# Patient Record
Sex: FEMALE | Race: WHITE | NOT HISPANIC OR LATINO | Employment: OTHER | URBAN - METROPOLITAN AREA
[De-identification: names, ages, dates, MRNs, and addresses within clinical notes are randomized per-mention and may not be internally consistent; named-entity substitution may affect disease eponyms.]

---

## 2017-11-02 ENCOUNTER — AMBULATORY - HEALTHEAST (OUTPATIENT)
Dept: NURSING | Facility: CLINIC | Age: 37
End: 2017-11-02

## 2017-11-02 DIAGNOSIS — Z00.00 HEALTHCARE MAINTENANCE: ICD-10-CM

## 2018-10-11 ENCOUNTER — AMBULATORY - HEALTHEAST (OUTPATIENT)
Dept: NURSING | Facility: CLINIC | Age: 38
End: 2018-10-11

## 2019-01-10 ENCOUNTER — AMBULATORY - HEALTHEAST (OUTPATIENT)
Dept: LAB | Facility: CLINIC | Age: 39
End: 2019-01-10

## 2019-01-10 DIAGNOSIS — R51.9 HEADACHE IN PREGNANCY, THIRD TRIMESTER: ICD-10-CM

## 2019-01-10 DIAGNOSIS — O26.892 HEADACHE IN PREGNANCY, ANTEPARTUM, SECOND TRIMESTER: ICD-10-CM

## 2019-01-10 DIAGNOSIS — O26.893 HEADACHE IN PREGNANCY, THIRD TRIMESTER: ICD-10-CM

## 2019-01-10 DIAGNOSIS — N89.8 VAGINAL DISCHARGE IN PREGNANCY, FIRST TRIMESTER: ICD-10-CM

## 2019-01-10 DIAGNOSIS — O26.891 VAGINAL DISCHARGE IN PREGNANCY, FIRST TRIMESTER: ICD-10-CM

## 2019-01-10 DIAGNOSIS — R51.9 HEADACHE IN PREGNANCY, ANTEPARTUM, SECOND TRIMESTER: ICD-10-CM

## 2019-01-10 LAB — PROGEST SERPL-MCNC: 21.7 NG/ML

## 2019-01-24 ENCOUNTER — AMBULATORY - HEALTHEAST (OUTPATIENT)
Dept: LAB | Facility: CLINIC | Age: 39
End: 2019-01-24

## 2019-01-24 DIAGNOSIS — O26.893 HEADACHE IN PREGNANCY, THIRD TRIMESTER: ICD-10-CM

## 2019-01-24 DIAGNOSIS — R51.9 HEADACHE IN PREGNANCY, THIRD TRIMESTER: ICD-10-CM

## 2019-01-24 DIAGNOSIS — O26.891 VAGINAL DISCHARGE IN PREGNANCY, FIRST TRIMESTER: ICD-10-CM

## 2019-01-24 DIAGNOSIS — N89.8 VAGINAL DISCHARGE IN PREGNANCY, FIRST TRIMESTER: ICD-10-CM

## 2019-01-24 DIAGNOSIS — R51.9 HEADACHE IN PREGNANCY, ANTEPARTUM, SECOND TRIMESTER: ICD-10-CM

## 2019-01-24 DIAGNOSIS — O26.892 HEADACHE IN PREGNANCY, ANTEPARTUM, SECOND TRIMESTER: ICD-10-CM

## 2019-01-24 LAB — PROGEST SERPL-MCNC: 14.2 NG/ML

## 2019-02-07 ENCOUNTER — AMBULATORY - HEALTHEAST (OUTPATIENT)
Dept: LAB | Facility: CLINIC | Age: 39
End: 2019-02-07

## 2019-02-07 DIAGNOSIS — O26.892 HEADACHE IN PREGNANCY, ANTEPARTUM, SECOND TRIMESTER: ICD-10-CM

## 2019-02-07 DIAGNOSIS — O26.893 HEADACHE IN PREGNANCY, THIRD TRIMESTER: ICD-10-CM

## 2019-02-07 DIAGNOSIS — N89.8 VAGINAL DISCHARGE IN PREGNANCY, FIRST TRIMESTER: ICD-10-CM

## 2019-02-07 DIAGNOSIS — R51.9 HEADACHE IN PREGNANCY, THIRD TRIMESTER: ICD-10-CM

## 2019-02-07 DIAGNOSIS — O26.891 VAGINAL DISCHARGE IN PREGNANCY, FIRST TRIMESTER: ICD-10-CM

## 2019-02-07 DIAGNOSIS — R51.9 HEADACHE IN PREGNANCY, ANTEPARTUM, SECOND TRIMESTER: ICD-10-CM

## 2019-02-07 LAB — PROGEST SERPL-MCNC: 21 NG/ML

## 2019-02-26 ENCOUNTER — AMBULATORY - HEALTHEAST (OUTPATIENT)
Dept: LAB | Facility: CLINIC | Age: 39
End: 2019-02-26

## 2019-02-26 DIAGNOSIS — R51.9 HEADACHE IN PREGNANCY, THIRD TRIMESTER: ICD-10-CM

## 2019-02-26 DIAGNOSIS — O26.892 HEADACHE IN PREGNANCY, ANTEPARTUM, SECOND TRIMESTER: ICD-10-CM

## 2019-02-26 DIAGNOSIS — O26.893 HEADACHE IN PREGNANCY, THIRD TRIMESTER: ICD-10-CM

## 2019-02-26 DIAGNOSIS — R51.9 HEADACHE IN PREGNANCY, ANTEPARTUM, SECOND TRIMESTER: ICD-10-CM

## 2019-02-26 DIAGNOSIS — O26.891 VAGINAL DISCHARGE IN PREGNANCY, FIRST TRIMESTER: ICD-10-CM

## 2019-02-26 DIAGNOSIS — N89.8 VAGINAL DISCHARGE IN PREGNANCY, FIRST TRIMESTER: ICD-10-CM

## 2019-02-26 LAB — PROGEST SERPL-MCNC: 19.6 NG/ML

## 2019-08-09 ENCOUNTER — RECORDS - HEALTHEAST (OUTPATIENT)
Dept: ADMINISTRATIVE | Facility: OTHER | Age: 39
End: 2019-08-09

## 2019-08-15 ENCOUNTER — RECORDS - HEALTHEAST (OUTPATIENT)
Dept: ADMINISTRATIVE | Facility: OTHER | Age: 39
End: 2019-08-15

## 2019-08-17 ENCOUNTER — ANESTHESIA - HEALTHEAST (OUTPATIENT)
Dept: OBGYN | Facility: HOSPITAL | Age: 39
End: 2019-08-17

## 2019-08-19 ENCOUNTER — HOME CARE/HOSPICE - HEALTHEAST (OUTPATIENT)
Dept: HOME HEALTH SERVICES | Facility: HOME HEALTH | Age: 39
End: 2019-08-19

## 2019-08-31 ENCOUNTER — AMBULATORY - HEALTHEAST (OUTPATIENT)
Dept: PEDIATRICS | Facility: CLINIC | Age: 39
End: 2019-08-31

## 2019-10-16 ENCOUNTER — AMBULATORY - HEALTHEAST (OUTPATIENT)
Dept: NURSING | Facility: CLINIC | Age: 39
End: 2019-10-16

## 2020-07-01 ENCOUNTER — AMBULATORY - HEALTHEAST (OUTPATIENT)
Dept: ADMINISTRATIVE | Facility: CLINIC | Age: 40
End: 2020-07-01

## 2020-07-01 DIAGNOSIS — L98.8 LESION OF SKIN OF BREAST: ICD-10-CM

## 2020-07-20 ENCOUNTER — HOSPITAL ENCOUNTER (OUTPATIENT)
Dept: SURGERY | Facility: CLINIC | Age: 40
Discharge: HOME | End: 2020-07-20
Attending: OBSTETRICS & GYNECOLOGY

## 2020-07-20 DIAGNOSIS — L72.3 SEBACEOUS CYST: ICD-10-CM

## 2020-07-20 ASSESSMENT — MIFFLIN-ST. JEOR: SCORE: 1849.09

## 2020-09-22 ENCOUNTER — AMBULATORY - HEALTHEAST (OUTPATIENT)
Dept: NURSING | Facility: CLINIC | Age: 40
End: 2020-09-22

## 2021-01-15 ENCOUNTER — COMMUNICATION - HEALTHEAST (OUTPATIENT)
Dept: SCHEDULING | Facility: CLINIC | Age: 41
End: 2021-01-15

## 2021-01-15 ENCOUNTER — OFFICE VISIT - HEALTHEAST (OUTPATIENT)
Dept: FAMILY MEDICINE | Facility: CLINIC | Age: 41
End: 2021-01-15

## 2021-01-15 DIAGNOSIS — J02.9 SORE THROAT: ICD-10-CM

## 2021-01-15 RX ORDER — PENICILLIN V POTASSIUM 500 MG/1
TABLET, FILM COATED ORAL
Status: SHIPPED | COMMUNITY
Start: 2021-01-15 | End: 2023-06-16

## 2021-01-16 ENCOUNTER — AMBULATORY - HEALTHEAST (OUTPATIENT)
Dept: FAMILY MEDICINE | Facility: CLINIC | Age: 41
End: 2021-01-16

## 2021-01-16 DIAGNOSIS — J02.9 SORE THROAT: ICD-10-CM

## 2021-01-17 LAB
SARS-COV-2 PCR COMMENT: NORMAL
SARS-COV-2 RNA SPEC QL NAA+PROBE: NEGATIVE
SARS-COV-2 VIRUS SPECIMEN SOURCE: NORMAL

## 2021-01-18 ENCOUNTER — COMMUNICATION - HEALTHEAST (OUTPATIENT)
Dept: SCHEDULING | Facility: CLINIC | Age: 41
End: 2021-01-18

## 2021-05-31 NOTE — PROGRESS NOTES
[facility] Lactation Visit    Assessment:  Katerina Kapoor is a female, Gestational Age: 38w3d infant, seen in clinic today for a lactation visit.     Katerina Kapoor is having difficulties with feeding. Katerina Kapoor has lost 0.9 oz since their last visit 4 days ago. She is down -7% from birthweight. She is very sleepy at the breast and requires frequent stimulation to initiate sucking. Mother reports infant is able to take 2 oz of pumped milk or formula without any difficulties. Infant was able to transfer 1.4 oz from the breast today. This has improved from last week's lactation visit. She is voiding and stooling adequately. Discussed more frequent feedings from the breast and supplementation after nursing.     Infant also with jaundice appearance today. Given poor weight gain and sleepiness, elected to recheck bilirubin again today. Gave report to Dr. Stephen, on-call physician, who will follow up with the results and call family.     1.  difficulty in feeding at breast     2. Fetal and  jaundice  Bilirubin,  Panel       Plan:    Feeding plan:  It is important for you to breast-feed Katerina Kapoor every 2-3 hours or more frequently based on Katerina Kapoor's cues. This should be about 8-12 times a day. Nurse every 2 hours during the day and every 3 hours during the night. It is important that you don't wait longer than 3 hours for the next feeding. Offer both breasts for each feeding. Use nipple shield with nursing. Make sure lips are flanged out. Offer both breasts for each feeding. Stimulate Katerina frequently while nursing.     Supplementation plan:  Katerina Kapoor should be supplemented with expressed breast-milk or formula after breast-feeding. May offer 1.5-2 oz after each nursing. May increase base on her cues. Typically at this age, she should get about 2-3 oz per feeding on average.     Pumping plan:  To ensure you have adequate milk supply, you should continue to pump  after feeds. The more your breast-feed and pump, the more you make more milk. Empty breasts gives you more milk for the next feeding.     Start Vitamin D supplementation 400 international units, once a day, when Katerina Kapoor is back to birthweight.    Katerina Kapoor should have about 6-8 wet diapers and about 2-4 stools per day.     Follow up in clinic in 5 days for a follow up and weight check.  Please return to clinic sooner, if Katerina Kapoor is not wanting to feed, becomes more sleepy, has less than 4-6 wet diapers in a day, develops a fever greater than 100.4 F, increasing signs of jaundice, or is inconsolable. ____________________________________________________________________  SUBJECTIVE:     Katerina Kapoor is a 2 wk.o., female, for lactation support. This patient is accompanied in the office by her mother.    Concerns: gave 36 cc in the last hour prior to clinic appt today. Following up today from last week. Infant needs frequent stimulation at the breast.    Baby is nursing every 3 hours for about 30-60 minutes per session.   Mother reports hearing audible swallows.   Baby feeds about 8 times in 24 hours and wakes up on own for feeds. Mom is using a nipple shield but mom sometimes infant cant nurse without a nipple shield. Baby stimulation at the breast.     Baby is supplemented with pumped milk and formula, about 2 oz after feeds about 2-3 times a day.    Baby has about 8-9 wet diapers and 7 stools per day. Stools are yellow in color.    Mom is also pumping about 2 per day and gets about 2 oz per pumping session.    Mother reports history of poor milk supply with first child.     Patient Active Problem List    Diagnosis Date Noted     Group B Streptococcus exposure with inadequate intrapartum antibiotic prophylaxis 2019     Term , current hospitalization 2019     Results for orders placed or performed in visit on 19   Bilirubin,  Panel   Result Value Ref Range     Bilirubin, Total 14.3 (H) 0.0 - 6.0 mg/dL    Bilirubin, Direct 0.5 <=0.5 mg/dL    Bilirubin, Indirect 13.8 (H) 0.0 - 6.0 mg/dL    Age in Hours 312 hours       Current Outpatient Medications:      nystatin (MYCOSTATIN) ointment, Apply topically 3 (three) times a day., Disp: 30 g, Rfl: 1  No past medical history on file.  Past Surgical History:   Procedure Laterality Date     NO PAST SURGERIES       Family History   Problem Relation Age of Onset     Mental illness Mother         Copied from mother's history at birth     Allergy (severe) Mother      Allergy (severe) Maternal Uncle      Asthma Maternal Uncle      Hypertension Maternal Grandmother      Diabetes Maternal Grandmother      Cancer Maternal Grandmother      Heart disease Maternal Grandfather      Eczema Paternal Grandmother      Hyperlipidemia Paternal Grandfather      Cancer Paternal Grandfather      Mental illness Paternal Grandfather        Primary care provider: Ritika Alexander MD    OBJECTIVE:    Mother:   Nipples are everted, the areola is compressible, the breast is soft and full.     Sore nipples: none.   Maternal pain: none.    Maternal depression screening: Doing well    Infant:   Vitals:    19 1515   Pulse: 140   Temp: 98.4  F (36.9  C)   TempSrc: Rectal   Weight: 6 lb 15.6 oz (3.164 kg)       Average weight gain over last 4 days: -0.9 oz     Weight:   Birthweight: 7 lb 8 oz (3.402 kg)  Clinic weight on 19: 7 lbs 0.5 oz  Today's weight:   Vitals:    19 1515   Weight: 6 lb 15.6 oz (3.164 kg)       -7% from birth weight.    Lactation scale pre-feeding weight: 6 lbs 15.6 oz    Weight after left side feedin lbs 15.6 oz  Weight after right side feedin lbs 1.2 oz  Amount of milk transferred from LEFT side: 0 oz  Amount of milk transferred from RIGHT side: 1.4 oz    Total transfer: 1.4 oz    Feeding assessment:     Infant can draw gloved finger into mouth. Infant demonstrated a coordinated suck. Infant palate is intact,  "tongue over gums, normal frenulum.   Infant can hold suction with tongue while at the breast. Infant needed frequent stimulation at the breast.     Alignment: Infant's head was aligned with its trunk. Infant did face mother. Baby was in cross-cradle position today. Discussed importance of infant ventral positioning to obtain a deeper latch.     Areolar Grasp:  Infant was assisted by gently applying downward pressure to the chin to open mouth wide.. Infant's lips were not pursed. Infant's lips did flange outward. Tongue was visible just barely over bottom lip. Infant had complete seal.     Areolar Compression: Infant made rhythmic motion. There were no clicking or smacking sounds. There was no severe nipple discomfort.  Nipples appeared slightly wedged (minimal creasing) after feeding.    Audible swallowing: Infant made quiet sounds of swallowing. There was an increase in frequency after milk ejection reflex    PHYSICAL EXAM    Gen: Alert, no acute distress.   Head: Anterior and posterior fontanelles are flat and soft.   Eyes: No eye drainage. Scleral icterus. Bilateral red reflexes present.   Ears: Pinna appear well-formed. No pits.   Nose: nares patent. No nasal congestion. No nasal flaring.  Mouth: Oral mucosa moist. Tongue midline (tongue elevation adequate when crying, good lateralization). Frenulum palpable. No \"heart-shaped\" tongue. Tongue able to extend pass lower gumline. Lips closed at rest.   Neck: Clavicles intact bilaterally.  Lungs: Clear to auscultation bilaterally.   Cardiac: Regular regular rate and rhythm, S1S2, no murmurs. Brachial and femoral pulses +2 and equal.  Abdomen: Soft, nontender, bowel sounds present, no hepatosplenomegaly or mass palpable. Umbilicus dry with no erythema or drainage.   : Rene stage 1 female genitalia  Skin: Intact. Dry. No rash. Mild jaundice down to chest.   Musculoskeletal: equal movements of upper and lower extremities. Negative Ortolani and Aguilar " maneuver.  Neuro: Appropriate muscle tone.    The visit lasted a total of 40 minutes that I spent face to face with the patient. Of that time, 35 minutes were spent on lactation. Over 50% of the time was spent counseling and educating the patient about normal  care and growth.    Completed by:   DMITRI Canchola, CPNP, IBCLC  [facility]  [facility]  2019, 1:23 PM

## 2021-05-31 NOTE — ANESTHESIA POSTPROCEDURE EVALUATION
Patient: Darlyn Kapoor  * No procedures listed *  Anesthesia type: epidural    Patient location: Labor and Delivery  Last vitals: No vitals data found for the desired time range.    Post vital signs: stable  Level of consciousness: awake and responds to simple questions  Post-anesthesia pain: pain controlled  Post-anesthesia nausea and vomiting: no  Pulmonary: unassisted, return to baseline  Cardiovascular: stable and blood pressure at baseline  Hydration: adequate  Anesthetic events: no    QCDR Measures:  ASA# 11 - Penny-op Cardiac Arrest: ASA11B - Patient did NOT experience unanticipated cardiac arrest  ASA# 12 - Penny-op Mortality Rate: ASA12B - Patient did NOT die  ASA# 13 - PACU Re-Intubation Rate: NA - No ETT / LMA used for case  ASA# 10 - Composite Anes Safety: ASA10A - No serious adverse event    Additional Notes:    Patient is comfortable s/p labor epidural. Patient is satisfied with her anesthetic and recovering well. Patient's sensory and motor function in LE are returning to baseline, emptying bladder as expected, minimal to no tenderness at neuraxial insertion site. Advised patient to alert her nurses, physicians, department of anesthesia if anything changes.

## 2021-05-31 NOTE — ANESTHESIA PREPROCEDURE EVALUATION
Anesthesia Evaluation      Patient summary reviewed     Airway   Mallampati: II   Pulmonary - negative ROS                          Cardiovascular - negative ROS   Neuro/Psych - negative ROS     Endo/Other    (+) diabetes mellitus, obesity, pregnant     Comments: Gestational DM    GI/Hepatic/Renal - negative ROS      Other findings: TOLAC      Dental - normal exam                        Anesthesia Plan  Planned anesthetic: epidural    ASA 2     Anesthetic plan and risks discussed with: patient and spouse    Post-op plan: routine recovery

## 2021-05-31 NOTE — ANESTHESIA PROCEDURE NOTES
Epidural Block    Patient location during procedure: OB  Time Called: 8/17/2019 12:31 PM  Reason for Block:labor epidural  Staffing:  Performing  Anesthesiologist: Tito Weller MD  Preanesthetic Checklist  Completed: patient identified, risks, benefits, and alternatives discussed, timeout performed, consent obtained, airway assessed, oxygen available, suction available, emergency drugs available and hand hygiene performed  Procedure  Patient position: sitting  Prep: ChloraPrep and site prepped and draped  Patient monitoring: continuous pulse oximetry, heart rate and blood pressure  Approach: midline  Location: L3-L4  Injection technique: [place] saline  Number of Attempts:1  Needle  Needle type: [facility] EHRchai   Needle gauge: 18 G     Catheter in Space: 3  Assessment  Sensory level:  No complications

## 2021-06-03 ENCOUNTER — RECORDS - HEALTHEAST (OUTPATIENT)
Dept: ADMINISTRATIVE | Facility: CLINIC | Age: 41
End: 2021-06-03

## 2021-06-04 VITALS — HEIGHT: 68 IN | WEIGHT: 251 LBS | BODY MASS INDEX: 38.04 KG/M2

## 2021-06-09 NOTE — PROGRESS NOTES
"History:  This is a 40 y.o. female who I'm asked to see for evaluation of a skin lesion involving her left breast.  She states that she had a \"bubble\" directly underneath her skin within the left breast lower outer quadrant from February through .  At one point in time it had become infected.  She describes it becoming enlarged, painful, and red.  She used rubbing alcohol and over the course of 5 days it decreased in size and symptoms.  Now over the last month, she feels like this area has disappeared.  There is just a small dark spot that remains where she used to be able to feel the lump.    Allergies:  No Known Allergies    Past medical history:  Depression/anxiety  PCOS    Is currently nursing her 11-month-old    Past surgical history:    [place] tooth extraction    Current Outpatient Medications:      cholecalciferol, vitamin D3, (VITAMIN D3 ORAL), Take by mouth., Disp: , Rfl:      UNABLE TO FIND, Med Name: [place] by mouth, supplement for breast milk supply, Disp: , Rfl:      cetirizine (ZYRTEC) 10 MG tablet, Take 10 mg by mouth daily., Disp: , Rfl:      prenatal no115-iron-folic acid 29 mg iron- 1 mg Chew, Chew daily., Disp: , Rfl:     Family history:  She has no family history of breast cancer.    Social history:  Reports that she has never smoked. She has never used smokeless tobacco. She reports previous alcohol use. She reports that she does not use drugs.  Has 3 children.    Review of Systems:  General: No complaints or constitutional symptoms  Skin: No complaints or symptoms   Hematologic/Lymphatic: No symptoms or complaints  Psychiatric: No symptoms or complaints  Endocrine: No excessive fatigue, no hypermetabolic symptoms reported  Respiratory: No cough, shortness of breath, or wheezing  Cardiovascular: No chest pain or dyspnea on exertion  Breast: Per HPI  Gastrointestinal: No abdominal pain, nausea, diarrhea, or constipation  Musculoskeletal: No recent injuries reported  Neurological: No " "focal neurologic defects reported.      Physical Exam:  Resp 16   Ht 5' 7.5\" (1.715 m)   Wt (!) 251 lb (113.9 kg)   Breastfeeding Yes   BMI 38.73 kg/m    General: Alert, cooperative, appears stated age   Skin: Skin color, texture, turgor normal, no rashes or lesions   Lymphatic: No obvious adenopathy, no swelling   Eyes: No scleral icterus, pupils equal  HENT: No traumatic injury to the head or face, no gross abnormalities  Lungs: Normal respiratory effort, breath sounds equal bilaterally  Heart: Regular rate and rhythm  Breasts: Symmetrical and pendulous.  No palpable abnormality on either side.  There is a small area of skin darkening in the area where she used to have her palpable mass.  This is located at 5:00 zone 2.  There is currently no palpable lesion within this area.  Abdomen: Soft, non-distended and non-tender to palpation  Neurologic: Grossly intact    Imaging:  None    Pathology:  None    IMPRESSION:  Suspect that the patient has had a symptomatic sebaceous cyst involving the skin of her left breast.    PLAN:   If there was actually a palpable lesion, we could remove this in the office under local anesthetic.  Removing a sebaceous cyst could help decrease the chance of recurrent infections.  At this time, since the cyst appears to have significantly decreased in size, I would not recommend any intervention.    She should start her screening mammograms at the age of 41.  Follow-up in breast clinic as needed.    Betty Craig DO  General Surgeon  [address] [address]  Office: [phone]  Employed by - [facility]    "

## 2021-06-09 NOTE — PROGRESS NOTES
"Darlyn presents to [facility] today for a surgical consult with Dr. Craig regarding a left breast mass.  RN assessment and EMR update.  Resp 16   Ht 5' 7.5\" (1.715 m)   Wt (!) 251 lb (113.9 kg)   Breastfeeding Yes   BMI 38.73 kg/m  .  Patient states the mass has almost completely disappeared.  She met with Dr. Craig, see dictation for details of visit. She will plan to follow up as needed with Dr. Craig.  RN time 12 mins  "

## 2021-06-14 NOTE — PROGRESS NOTES
"Darlyn Kapoor is a 40 y.o. female who is being evaluated via a billable telephone visit.      What phone number would you like to be contacted at? [phone]  How would you like to obtain your AVS? AVS Preference: Mail a copy.  Assessment & Plan     Darlyn was seen today for question on covid.    Sore throat  -     Symptomatic COVID-19 Virus (CORONAVIRUS) PCR; Future  Since that she had a positive strep throat I think that it could definitely account for the symptoms that she is having.  But because of the pandemic becomes necessary to also check for COVID-19.  I did put in the order for COVID-19.  She will continue with treatment that she was started on.    5 minutes spent on the date of the encounter doing chart review, history and exam, documentation and further activities as noted above         BMI:   Estimated body mass index is 38.73 kg/m  as calculated from the following:    Height as of 7/20/20: 5' 7.5\" (1.715 m).    Weight as of 7/20/20: 251 lb (113.9 kg).         No follow-ups on file.    Robert Dozier MD  [facility]     Darlyn Kapoor is 40 y.o. and presents to clinic today for the following health issues   HPI   She calls in with concerns regarding having been tested positive for strep infection.  She was having symptoms that included sore throat, chills, fatigue and tiredness and some shortness of breath.  She was also having muscle aches and pain.  She is a wondering if isolated to the tested for COVID-19.  She noted no exposure to COVID-19.  And noted no family member who has a Covid but that her children to school.  She denies having any cough, but a low bit of runny nose.  No diarrhea or constipation no abdominal pain and no rash.      Review of Systems  Review of system is as noted above.      Objective    Vitals - Patient Reported  Weight (Patient Reported): 250 lb (113.4 kg)    Physical Exam  Examination was not done because it is " phone visit, but her voice does sound normal.            Phone call duration: 5 minutes

## 2021-06-14 NOTE — TELEPHONE ENCOUNTER
Triage call:    [facility] It was recommended that she get tested for COVID.  Mild shortness of breath, feels winded with activity more than normal.     Pt was advised of protocol recommendation/disposition of ED or office with PCP approval or alternate disposition of telemedicine visit.  Patient was agreeable to telemedicine visit today. PT transferred to scheduling to make appointment.     Candace Lopez RN 01/15/21 10:38 AM  [facility] Nurse Advisor      COVID 19 Nurse Triage Plan/Patient Instructions    Please be aware that novel coronavirus (COVID-19) may be circulating in the community. If you develop symptoms such as fever, cough, or SOB or if you have concerns about the presence of another infection including coronavirus (COVID-19), please contact your health care provider or visit [email or web address].     Disposition/Instructions    Virtual Visit with provider recommended. Reference Visit Selection Guide.    Thank you for taking steps to prevent the spread of this virus.  o Limit your contact with others.  o Wear a simple mask to cover your cough.  o Wash your hands well and often.    Resources    [facility]: About COVID-19: [email or web address]    CDC: What to Do If You're Sick: www.cdc.gov/coronavirus/2019-ncov/about/steps-when-sick.html    CDC: Ending Home Isolation: www.cdc.gov/coronavirus/2019-ncov/hcp/disposition-in-home-patients.html     CDC: Caring for Someone: www.cdc.gov/coronavirus/2019-ncov/if-you-are-sick/care-for-someone.html     [facility]: Interim Guidance for Hospital Discharge to Home: www.health.state.[place]/diseases/coronavirus/hcp/hospdischarge.pdf    [facility] clinical trials (COVID-19 research studies): [email or web address]     Below are the COVID-19 hotlines at the [place] Department of Health ([facility]). Interpreters are available.   o For health questions: Call 881-670-0998 or  1-880.660.6983 (7 a.m. to 7 p.m.)  o For questions about schools and childcare: Call 797-983-3907 or 1-892.799.4481 (7 a.m. to 7 p.m.)     Reason for Disposition    MILD difficulty breathing (e.g., minimal/no SOB at rest, SOB with walking, pulse <100)    Additional Information    Negative: SEVERE difficulty breathing (e.g., struggling for each breath, speaks in single words)    Negative: Difficult to awaken or acting confused (e.g., disoriented, slurred speech)    Negative: Bluish (or gray) lips or face now    Negative: Shock suspected (e.g., cold/pale/clammy skin, too weak to stand, low BP, rapid pulse)    Negative: Sounds like a life-threatening emergency to the triager    Negative: [1] COVID-19 exposure AND [2] no symptoms    Negative: [1] Lives with someone known to have influenza (flu test positive) AND [2] flu-like symptoms (e.g., cough, runny nose, sore throat, SOB; with or without fever)    Negative: [1] Adult with possible COVID-19 symptoms AND [2] triager concerned about severity of symptoms or other causes    Negative: Immunization reaction suspected (e.g., fever, headache, muscle aches occurring during days 1-3 days after immunization)    Negative: COVID-19 and breastfeeding, questions about    Negative: SEVERE or constant chest pain or pressure (Exception: mild central chest pain, present only when coughing)    Negative: MODERATE difficulty breathing (e.g., speaks in phrases, SOB even at rest, pulse 100-120)    Negative: [1] Headache AND [2] stiff neck (can't touch chin to chest)    Protocols used: CORONAVIRUS (COVID-19) DIAGNOSED OR [id number] 12.1.20

## 2021-06-16 PROBLEM — Z34.90 PREGNANT: Status: ACTIVE | Noted: 2019-08-17

## 2021-06-26 ENCOUNTER — HEALTH MAINTENANCE LETTER (OUTPATIENT)
Age: 41
End: 2021-06-26

## 2021-07-03 NOTE — ADDENDUM NOTE
Addendum Note by Lombardi, Susan L, RN at 7/20/2020  9:45 AM     Author: Lombardi, Susan L, RN Service: -- Author Type: RN, Care Manager    Filed: 7/20/2020  1:47 PM Date of Service: 7/20/2020  9:45 AM Status: Signed    : Lombardi, Susan L, RN (RN, Care Manager)    Encounter addended by: Lombardi, Susan L, RN on: 7/20/2020  1:47 PM      Actions taken: Clinical Note Signed, Charge Capture section accepted

## 2021-10-01 ENCOUNTER — IMMUNIZATION (OUTPATIENT)
Dept: FAMILY MEDICINE | Facility: CLINIC | Age: 41
End: 2021-10-01
Payer: COMMERCIAL

## 2021-10-01 PROCEDURE — 90686 IIV4 VACC NO PRSV 0.5 ML IM: CPT

## 2021-10-01 PROCEDURE — 90471 IMMUNIZATION ADMIN: CPT

## 2022-07-23 ENCOUNTER — HEALTH MAINTENANCE LETTER (OUTPATIENT)
Age: 42
End: 2022-07-23

## 2022-10-01 ENCOUNTER — HEALTH MAINTENANCE LETTER (OUTPATIENT)
Age: 42
End: 2022-10-01

## 2023-06-01 ENCOUNTER — TRANSFERRED RECORDS (OUTPATIENT)
Dept: MULTI SPECIALTY CLINIC | Facility: CLINIC | Age: 43
End: 2023-06-01

## 2023-06-15 ASSESSMENT — ENCOUNTER SYMPTOMS
ARTHRALGIAS: 0
HEMATURIA: 0
NERVOUS/ANXIOUS: 0
FEVER: 0
HEMATOCHEZIA: 0
EYE PAIN: 0
HEADACHES: 0
DIZZINESS: 0
CHILLS: 0
SHORTNESS OF BREATH: 0
FREQUENCY: 1
ABDOMINAL PAIN: 0
WEAKNESS: 0
SORE THROAT: 0
JOINT SWELLING: 0
COUGH: 0
DIARRHEA: 0
CONSTIPATION: 0
NAUSEA: 0
PARESTHESIAS: 0
MYALGIAS: 0
PALPITATIONS: 0
DYSURIA: 0
HEARTBURN: 0
BREAST MASS: 0

## 2023-06-15 ASSESSMENT — PATIENT HEALTH QUESTIONNAIRE - PHQ9
SUM OF ALL RESPONSES TO PHQ QUESTIONS 1-9: 3
10. IF YOU CHECKED OFF ANY PROBLEMS, HOW DIFFICULT HAVE THESE PROBLEMS MADE IT FOR YOU TO DO YOUR WORK, TAKE CARE OF THINGS AT HOME, OR GET ALONG WITH OTHER PEOPLE: SOMEWHAT DIFFICULT
SUM OF ALL RESPONSES TO PHQ QUESTIONS 1-9: 3

## 2023-06-16 ENCOUNTER — OFFICE VISIT (OUTPATIENT)
Dept: FAMILY MEDICINE | Facility: CLINIC | Age: 43
End: 2023-06-16
Payer: COMMERCIAL

## 2023-06-16 VITALS
DIASTOLIC BLOOD PRESSURE: 64 MMHG | HEART RATE: 73 BPM | WEIGHT: 249.8 LBS | SYSTOLIC BLOOD PRESSURE: 118 MMHG | OXYGEN SATURATION: 97 % | BODY MASS INDEX: 39.21 KG/M2 | HEIGHT: 67 IN

## 2023-06-16 DIAGNOSIS — Z00.00 ROUTINE GENERAL MEDICAL EXAMINATION AT A HEALTH CARE FACILITY: ICD-10-CM

## 2023-06-16 DIAGNOSIS — Z23 NEED FOR VACCINATION: Primary | ICD-10-CM

## 2023-06-16 PROBLEM — E28.2 PCOS (POLYCYSTIC OVARIAN SYNDROME): Status: ACTIVE | Noted: 2023-06-16

## 2023-06-16 PROBLEM — Z34.90 PREGNANT: Status: RESOLVED | Noted: 2019-08-17 | Resolved: 2023-06-16

## 2023-06-16 PROBLEM — A69.20 LYME DISEASE: Status: ACTIVE | Noted: 2023-06-16

## 2023-06-16 PROCEDURE — 90471 IMMUNIZATION ADMIN: CPT | Performed by: NURSE PRACTITIONER

## 2023-06-16 PROCEDURE — 90738 INACTIVATED JE VACC IM: CPT | Performed by: NURSE PRACTITIONER

## 2023-06-16 PROCEDURE — 99396 PREV VISIT EST AGE 40-64: CPT | Mod: 25 | Performed by: NURSE PRACTITIONER

## 2023-06-16 RX ORDER — METFORMIN HCL 500 MG
1 TABLET, EXTENDED RELEASE 24 HR ORAL DAILY
COMMUNITY
Start: 2023-06-08

## 2023-06-16 ASSESSMENT — ENCOUNTER SYMPTOMS
SHORTNESS OF BREATH: 0
CHILLS: 0
PALPITATIONS: 0
NERVOUS/ANXIOUS: 0
DIZZINESS: 0
WEAKNESS: 0
CONSTIPATION: 0
HEMATURIA: 0
PARESTHESIAS: 0
HEMATOCHEZIA: 0
HEADACHES: 0
FEVER: 0
HEARTBURN: 0
FREQUENCY: 1
NAUSEA: 0
BREAST MASS: 0
COUGH: 0
ABDOMINAL PAIN: 0
MYALGIAS: 0
ARTHRALGIAS: 0
DIARRHEA: 0
DYSURIA: 0
JOINT SWELLING: 0
EYE PAIN: 0
SORE THROAT: 0

## 2023-06-16 NOTE — PROGRESS NOTES
SUBJECTIVE:   CC: Janette is an 43 year old who presents for preventive health visit.     Patient is fasting for labs today. She will be traveling outside the country for the next year - leaving June 27, 2023. Patient and family will be going to a travel clinic in [place] to have needed vaccines. Would like something for Artis called to the pharmacy.  Counseled today what might be appropriate malaria prophylaxis.  They are going to multiple countries and duration in specific countries is not clear.  After counseling it was opted that she and the family would obtain their malaria prophylaxis when they go to the travel clinic.  She would like a Japanese encephalitis vaccine today as she knows she would benefit from that during her travels.    She has been following at another  Local clinic that is not in [facility] for her women's care, she has polycystic ovary syndrome uses metformin for that and does not need refills as she follows elsewhere.  Her Paps are normal and up-to-date and she recently had a Pap.    Has been healthy.  Did have 2 episodes of Lyme infection in the past few years.    Keeping up-to-date with mammograms.      6/16/2023     9:59 AM   Additional Questions   Roomed by abel skinner   Accompanied by self     Healthy Habits:     Getting at least 3 servings of Calcium per day:  Yes    Bi-annual eye exam:  Yes    Dental care twice a year:  Yes    Sleep apnea or symptoms of sleep apnea:  Excessive snoring    Diet:  Regular (no restrictions)    Frequency of exercise:  4-5 days/week    Duration of exercise:  15-30 minutes    Taking medications regularly:  Yes    Medication side effects:  Not applicable    PHQ-2 Total Score: 0    Additional concerns today:  No      Mammogram done on this date: 05/2023 (approximately), by this group: [facility], results were normal.     Last pap was within the last couple of years. Done at [facility] - no hx of abnormal paps.            Have you ever done  "Advance Care Planning? (For example, a Health Directive, POLST, or a discussion with a medical provider or your loved ones about your wishes): No, advance care planning information given to patient to review.  Advanced care planning was discussed at today's visit.    Social History     Tobacco Use     Smoking status: Never     Smokeless tobacco: Never   Vaping Use     Vaping status: Never Used   Substance Use Topics     Alcohol use: Yes     Comment: \"maybe 2 drinks per week\"             6/15/2023     8:03 PM   Alcohol Use   Prescreen: >3 drinks/day or >7 drinks/week? No     Reviewed orders with patient.  Reviewed health maintenance and updated orders accordingly - Yes  Lab work is in process    Breast Cancer Screenin/15/2023     8:04 PM   Breast CA Risk Assessment (FHS-7)   Do you have a family history of breast, colon, or ovarian cancer? No / Unknown         Mammogram Screening - Offered annual screening and updated Health Maintenance for mutual plan based on risk factor consideration    Pertinent mammograms are reviewed under the imaging tab.    History of abnormal Pap smear: NO - age 30-65 PAP every 5 years with negative HPV co-testing recommended     Reviewed and updated as needed this visit by clinical staff   Tobacco  Allergies  Meds    Surg Hx           Reviewed and updated as needed this visit by Provider        Surg Hx              Review of Systems   Constitutional: Negative for chills and fever.   HENT: Negative for congestion, ear pain, hearing loss and sore throat.    Eyes: Negative for pain and visual disturbance.   Respiratory: Negative for cough and shortness of breath.    Cardiovascular: Negative for chest pain, palpitations and peripheral edema.   Gastrointestinal: Negative for abdominal pain, constipation, diarrhea, heartburn, hematochezia and nausea.   Breasts:  Negative for tenderness, breast mass and discharge.   Genitourinary: Positive for frequency. Negative for dysuria, " "genital sores, hematuria, pelvic pain, urgency, vaginal bleeding and vaginal discharge.   Musculoskeletal: Negative for arthralgias, joint swelling and myalgias.   Skin: Positive for rash.   Neurological: Negative for dizziness, weakness, headaches and paresthesias.   Psychiatric/Behavioral: Negative for mood changes. The patient is not nervous/anxious.           OBJECTIVE:   /64 (BP Location: Right arm, Patient Position: Sitting)   Pulse 73   Ht 1.695 m (5' 6.75\")   Wt 113.3 kg (249 lb 12.8 oz)   LMP 2023 (Exact Date)   SpO2 97%   BMI 39.42 kg/m    Physical Exam  GENERAL: healthy, alert and no distress  EYES: Eyes grossly normal to inspection, PERRL and conjunctivae and sclerae normal  HENT: ear canals and TM's normal, nose and mouth without ulcers or lesions  NECK: no adenopathy, no asymmetry, masses, or scars and thyroid normal to palpation  RESP: lungs clear to auscultation - no rales, rhonchi or wheezes  CV: regular rate and rhythm, normal S1 S2, no S3 or S4, no murmur, click or rub, no peripheral edema and peripheral pulses strong  ABDOMEN: soft, nontender, no hepatosplenomegaly, no masses and bowel sounds normal  MS: no gross musculoskeletal defects noted, no edema  NEURO: Normal strength and tone, mentation intact and speech normal  PSYCH: mentation appears normal, affect normal/bright        ASSESSMENT/PLAN:       ICD-10-CM    1. Need for vaccination  Z23 Lao ENCEPHALITIS IM 16 YO +      2. Routine general medical examination at a health care facility  Z00.00                 COUNSELING:  Reviewed preventive health counseling, as reflected in patient instructions       Healthy diet/nutrition       Colorectal Cancer Screening       Consider Hep C screening for all patients one time for ages 18-79 years       HIV screeninx in teen years, 1x in adult years, and at intervals if high risk        She reports that she has never smoked. She has never used smokeless tobacco.      Melony " VIRGILIO Lujan  [facility]  Answers for HPI/ROS submitted by the patient on 6/15/2023  If you checked off any problems, how difficult have these problems made it for you to do your work, take care of things at home, or get along with other people?: Somewhat difficult  PHQ9 TOTAL SCORE: 3

## 2024-02-23 ENCOUNTER — OFFICE VISIT (OUTPATIENT)
Dept: FAMILY MEDICINE | Facility: CLINIC | Age: 44
End: 2024-02-23
Payer: COMMERCIAL

## 2024-02-23 VITALS
SYSTOLIC BLOOD PRESSURE: 110 MMHG | HEIGHT: 67 IN | DIASTOLIC BLOOD PRESSURE: 70 MMHG | BODY MASS INDEX: 39.55 KG/M2 | WEIGHT: 252 LBS | RESPIRATION RATE: 16 BRPM | HEART RATE: 68 BPM | OXYGEN SATURATION: 95 % | TEMPERATURE: 97.9 F

## 2024-02-23 DIAGNOSIS — J02.9 SORE THROAT: Primary | ICD-10-CM

## 2024-02-23 LAB
DEPRECATED S PYO AG THROAT QL EIA: NEGATIVE
GROUP A STREP BY PCR: NOT DETECTED

## 2024-02-23 PROCEDURE — 87651 STREP A DNA AMP PROBE: CPT | Performed by: NURSE PRACTITIONER

## 2024-02-23 PROCEDURE — 99213 OFFICE O/P EST LOW 20 MIN: CPT | Performed by: NURSE PRACTITIONER

## 2024-02-23 RX ORDER — ATOVAQUONE AND PROGUANIL HYDROCHLORIDE 250; 100 MG/1; MG/1
TABLET, FILM COATED ORAL
COMMUNITY
Start: 2023-12-21 | End: 2024-02-23

## 2024-02-23 ASSESSMENT — ENCOUNTER SYMPTOMS
GASTROINTESTINAL NEGATIVE: 1
CHILLS: 0
LIGHT-HEADEDNESS: 0
COUGH: 0
MYALGIAS: 0
FEVER: 0
SORE THROAT: 1
WHEEZING: 0
SHORTNESS OF BREATH: 1
HEADACHES: 0
DIZZINESS: 0
ARTHRALGIAS: 0
FATIGUE: 1

## 2024-02-23 NOTE — PROGRESS NOTES
"  Assessment & Plan     Sore throat  Rapid strep test is negative.  Awaiting confirmatory test results.  They will be traveling next week out of the country, so wanted to make sure she was healthy.  She has had sore throat and fatigue for the last 2 to 4 weeks.  She has no known exposures to strep throat but her son was spending time with a friend who had recent strep throat.  If confirmatory testing is negative, recommend treating as viral pharyngitis and treat symptoms as below:    Recommend Flonase (fluticasone) nasal spray for nasal congestion, 2 sprays in each nostril once daily X 2-4 weeks.  For cough, recommend Mucinex (guaifenesin) over-the-counter as directed.  For sore throat, headache, fever or body aches may take Tylenol or ibuprofen as directed.For sore throat, may also try salt water gargles, hot water with lemon and honey and lozenges    - Streptococcus A Rapid Screen w/Reflex to PCR - Clinic Collect  - Group A Streptococcus PCR Throat Swab            BMI  Estimated body mass index is 39.76 kg/m  as calculated from the following:    Height as of this encounter: 1.695 m (5' 6.75\").    Weight as of this encounter: 114.3 kg (252 lb).             Subjective   Janette is a 43 year old, presenting for the following health issues:  Pharyngitis (Sore throat, requested strep test) and URI (Pt has had cold sx for a couple weeks and will be going out of the country wants to make sure she is ok before they leave)        2/23/2024     9:19 AM   Additional Questions   Roomed by BRIAN Elliott     Via the Health Maintenance questionnaire, the patient has reported the following services have been completed -Mammogram, this information has been sent to the abstraction team.  Janette is a pleasant 43-year-old female who is accompanied by her daughter who presents today for sore throat and fatigue for the last 2 to 4 weeks.  States even carrying a little laundry up the stairs can be harder.  They are going to be traveling to the " [facility] and then to [place] next week so she wants to make sure she is healthy.  Relates that they had been doing a year of traveling.  They sold their house and are traveling with their children.  They were planning on visiting [place] but postponed due to lives in the country, they have been living with her mother for the last couple months.      She has tried supplements such as elderberry and vitamin C.  She has had COVID in the past but has not taken a recent COVID-19 test.  States she felt worse when she had COVID-19 in the past but did not think this was COVID-19.  We did discuss that repeat COVID can present differently and also present differently between people.  She did receive the COVID-19 booster vaccine 2 weeks ago.      History of Present Illness       Reason for visit:  Sore throat, feeling run down  Symptom onset:  1-2 weeks ago  Symptoms include:  Lower stamina, runny nose, sore throat  Symptom intensity:  Moderate  Symptom progression:  Improving  Had these symptoms before:  Yes  Has tried/received treatment for these symptoms:  No    She eats 2-3 servings of fruits and vegetables daily.She consumes 0 sweetened beverage(s) daily.She exercises with enough effort to increase her heart rate 30 to 60 minutes per day.  She exercises with enough effort to increase her heart rate 4 days per week.   She is taking medications regularly.                 Review of Systems   Constitutional:  Positive for fatigue. Negative for chills and fever.   HENT:  Positive for congestion and sore throat.    Respiratory:  Positive for shortness of breath (Dyspnea on exertion). Negative for cough and wheezing.    Cardiovascular:  Negative for chest pain.   Gastrointestinal: Negative.    Genitourinary: Negative.    Musculoskeletal:  Negative for arthralgias and myalgias.   Neurological:  Negative for dizziness, light-headedness and headaches.           Objective    /70 (BP Location: Right arm, Patient Position:  "Sitting, Cuff Size: Adult Large)   Pulse 68   Temp 97.9  F (36.6  C) (Tympanic)   Resp 16   Ht 1.695 m (5' 6.75\")   Wt 114.3 kg (252 lb)   LMP 02/07/2024 (Exact Date)   SpO2 95%   BMI 39.76 kg/m    Body mass index is 39.76 kg/m .  Physical Exam  Constitutional:       Appearance: She is obese. She is not ill-appearing.   HENT:      Head: Normocephalic and atraumatic.      Right Ear: Tympanic membrane, ear canal and external ear normal.      Left Ear: Tympanic membrane, ear canal and external ear normal.      Nose: Nose normal.      Mouth/Throat:      Mouth: Mucous membranes are moist.      Pharynx: Posterior oropharyngeal erythema (mild) present. No oropharyngeal exudate.   Eyes:      General:         Right eye: No discharge.         Left eye: No discharge.      Pupils: Pupils are equal, round, and reactive to light.   Cardiovascular:      Rate and Rhythm: Normal rate and regular rhythm.   Pulmonary:      Effort: Pulmonary effort is normal.      Breath sounds: Normal breath sounds. No wheezing, rhonchi or rales.   Musculoskeletal:      Cervical back: No rigidity.   Lymphadenopathy:      Cervical: No cervical adenopathy.   Skin:     General: Skin is warm and dry.   Neurological:      Mental Status: She is alert and oriented to person, place, and time.   Psychiatric:         Mood and Affect: Mood normal.                    Signed Electronically by: DMITRI Berry CNP    "

## 2024-02-23 NOTE — PATIENT INSTRUCTIONS
Recommend Flonase (fluticasone) nasal spray for nasal congestion, 2 sprays in each nostril once daily X 2-4 weeks.  For cough, recommend Mucinex (guaifenesin) over-the-counter as directed.  For sore throat, headache, fever or body aches may take Tylenol or ibuprofen as directed.For sore throat, may also try salt water gargles, hot water with lemon and honey and lozenges.

## 2024-07-21 ENCOUNTER — HEALTH MAINTENANCE LETTER (OUTPATIENT)
Age: 44
End: 2024-07-21

## 2025-08-10 ENCOUNTER — HEALTH MAINTENANCE LETTER (OUTPATIENT)
Age: 45
End: 2025-08-10